# Patient Record
Sex: MALE | Race: BLACK OR AFRICAN AMERICAN
[De-identification: names, ages, dates, MRNs, and addresses within clinical notes are randomized per-mention and may not be internally consistent; named-entity substitution may affect disease eponyms.]

---

## 2020-10-22 ENCOUNTER — HOSPITAL ENCOUNTER (INPATIENT)
Dept: HOSPITAL 92 - SURG A | Age: 50
LOS: 7 days | Discharge: HOME | DRG: 470 | End: 2020-10-29
Attending: INTERNAL MEDICINE | Admitting: ORTHOPAEDIC SURGERY
Payer: COMMERCIAL

## 2020-10-22 ENCOUNTER — HOSPITAL ENCOUNTER (OUTPATIENT)
Dept: HOSPITAL 92 - LABBT | Age: 50
Discharge: HOME | End: 2020-10-22
Attending: ORTHOPAEDIC SURGERY
Payer: COMMERCIAL

## 2020-10-22 VITALS — BODY MASS INDEX: 33.9 KG/M2

## 2020-10-22 DIAGNOSIS — M17.11: Primary | ICD-10-CM

## 2020-10-22 DIAGNOSIS — M17.11: ICD-10-CM

## 2020-10-22 DIAGNOSIS — Z20.828: ICD-10-CM

## 2020-10-22 DIAGNOSIS — Z01.818: Primary | ICD-10-CM

## 2020-10-22 DIAGNOSIS — R03.0: ICD-10-CM

## 2020-10-22 LAB
ANION GAP SERPL CALC-SCNC: 15 MMOL/L (ref 10–20)
BASOPHILS # BLD AUTO: 0.1 THOU/UL (ref 0–0.2)
BASOPHILS NFR BLD AUTO: 1.2 % (ref 0–1)
BUN SERPL-MCNC: 10 MG/DL (ref 8.9–20.6)
CALCIUM SERPL-MCNC: 9.4 MG/DL (ref 7.8–10.44)
CHLORIDE SERPL-SCNC: 107 MMOL/L (ref 98–107)
CO2 SERPL-SCNC: 20 MMOL/L (ref 22–29)
CREAT CL PREDICTED SERPL C-G-VRATE: 0 ML/MIN (ref 70–130)
EOSINOPHIL # BLD AUTO: 0.1 THOU/UL (ref 0–0.7)
EOSINOPHIL NFR BLD AUTO: 2.5 % (ref 0–10)
GLUCOSE SERPL-MCNC: 117 MG/DL (ref 70–105)
HGB BLD-MCNC: 15.5 G/DL (ref 14–18)
INR PPP: 0.9
LYMPHOCYTES # BLD: 1.8 THOU/UL (ref 1.2–3.4)
LYMPHOCYTES NFR BLD AUTO: 31.9 % (ref 21–51)
MCH RBC QN AUTO: 28.1 PG (ref 27–31)
MCV RBC AUTO: 85.4 FL (ref 78–98)
MONOCYTES # BLD AUTO: 0.6 THOU/UL (ref 0.11–0.59)
MONOCYTES NFR BLD AUTO: 9.8 % (ref 0–10)
NEUTROPHILS # BLD AUTO: 3.1 THOU/UL (ref 1.4–6.5)
NEUTROPHILS NFR BLD AUTO: 54.6 % (ref 42–75)
PLATELET # BLD AUTO: 279 THOU/UL (ref 130–400)
POTASSIUM SERPL-SCNC: 4.3 MMOL/L (ref 3.5–5.1)
PROTHROMBIN TIME: 11.9 SEC (ref 12–14.7)
RBC # BLD AUTO: 5.53 MILL/UL (ref 4.7–6.1)
SODIUM SERPL-SCNC: 138 MMOL/L (ref 136–145)
WBC # BLD AUTO: 5.7 THOU/UL (ref 4.8–10.8)

## 2020-10-22 PROCEDURE — 80048 BASIC METABOLIC PNL TOTAL CA: CPT

## 2020-10-22 PROCEDURE — C1776 JOINT DEVICE (IMPLANTABLE): HCPCS

## 2020-10-22 PROCEDURE — 87081 CULTURE SCREEN ONLY: CPT

## 2020-10-22 PROCEDURE — 36415 COLL VENOUS BLD VENIPUNCTURE: CPT

## 2020-10-22 PROCEDURE — 85027 COMPLETE CBC AUTOMATED: CPT

## 2020-10-22 PROCEDURE — C1713 ANCHOR/SCREW BN/BN,TIS/BN: HCPCS

## 2020-10-22 PROCEDURE — U0003 INFECTIOUS AGENT DETECTION BY NUCLEIC ACID (DNA OR RNA); SEVERE ACUTE RESPIRATORY SYNDROME CORONAVIRUS 2 (SARS-COV-2) (CORONAVIRUS DISEASE [COVID-19]), AMPLIFIED PROBE TECHNIQUE, MAKING USE OF HIGH THROUGHPUT TECHNOLOGIES AS DESCRIBED BY CMS-2020-01-R: HCPCS

## 2020-10-22 PROCEDURE — 87635 SARS-COV-2 COVID-19 AMP PRB: CPT

## 2020-10-22 PROCEDURE — 85610 PROTHROMBIN TIME: CPT

## 2020-10-22 PROCEDURE — 93005 ELECTROCARDIOGRAM TRACING: CPT

## 2020-10-22 PROCEDURE — 85025 COMPLETE CBC W/AUTO DIFF WBC: CPT

## 2020-10-22 PROCEDURE — 93010 ELECTROCARDIOGRAM REPORT: CPT

## 2020-10-25 NOTE — EKG
Test Reason : 

Blood Pressure : ***/*** mmHG

Vent. Rate : 082 BPM     Atrial Rate : 082 BPM

   P-R Int : 158 ms          QRS Dur : 096 ms

    QT Int : 358 ms       P-R-T Axes : 035 059 026 degrees

   QTc Int : 418 ms

 

Normal sinus rhythm

Normal ECG

No previous ECGs available

Confirmed by JASEN MCALLISTER (2) on 10/25/2020 12:01:47 PM

 

Referred By:  JENNIFER           Confirmed By:JASEN MCALLISTER

## 2020-10-27 PROCEDURE — 0SRC0J9 REPLACEMENT OF RIGHT KNEE JOINT WITH SYNTHETIC SUBSTITUTE, CEMENTED, OPEN APPROACH: ICD-10-PCS | Performed by: ORTHOPAEDIC SURGERY

## 2020-10-27 RX ADMIN — CEFAZOLIN SODIUM SCH MLS: 2 SOLUTION INTRAVENOUS at 20:39

## 2020-10-27 RX ADMIN — HYDROCODONE BITARTRATE AND ACETAMINOPHEN PRN TAB: 10; 325 TABLET ORAL at 19:08

## 2020-10-27 RX ADMIN — HYDROCODONE BITARTRATE AND ACETAMINOPHEN PRN TAB: 10; 325 TABLET ORAL at 23:24

## 2020-10-27 RX ADMIN — ASPIRIN SCH: 81 TABLET ORAL at 16:45

## 2020-10-27 RX ADMIN — ASPIRIN SCH MG: 81 TABLET ORAL at 20:40

## 2020-10-27 NOTE — PDOC.HHP
Hospitalist HPI





- History of Present Illness


Right knee pain


History of Present Illness: 





PCP: None





Patient is a 50-year-old male with a past medical history significant for 

degenerative joint disease that presents to the hospital as a direct admit for a

scheduled right total knee replacement with Dr. Trivedi.  The patient has been 

complaining of right knee swelling and pain for some time now.  Today, the 

patient underwent a right total knee replacement with Dr. Trivedi.  Postop, the 

patient appeared to be hypertensive with a recorded blood pressure of 175/95 on 

arrival to the floor, with a stable pulse, respirations, SPO2, afebrile.  We 

were consulted for blood pressure management.  Patient denies any chest pain, 

shortness of breath or headache.  He denies any lightheadedness.  He reports 

that he is stooling and voiding per usual.  He has no other complaints at this 

time.


ED Course: 





Direct admit.





Hospitalist ROS





- Review of Systems


Constitutional: denies: fever, chills


Eyes: denies: pain, vision change


ENT: denies: ear pain, nose pain


Respiratory: denies: cough, dry, shortness of breath, hemoptysis, SOB with 

excertion


Cardiovascular: denies: chest pain, palpitations, edema, light headedness


Gastrointestinal: denies: nausea, vomiting, abdominal pain, diarrhea, melena, he

matochezia


Genitourinary: denies: dysuria, hematuria


Musculoskeletal: denies: neck pain, shoulder pain


Neurological: denies: weakness, incoordination, change in speech


All other systems reviewed; all pertinent +/- noted in HPI/Subj





- Medication


Medications: 


Active Medications











Generic Name Dose Route Start Last Admin





  Trade Name Tom  PRN Reason Stop Dose Admin


 


Aspirin  81 mg  10/27/20 09:00  10/27/20 16:45





  Aspirin 81 Mg Enteric Coated Tablet  PO   Not Given





  BID [facility]  


 


Sodium Chloride  1,000 mls @ 100 mls/hr  10/27/20 07:15  10/27/20 16:48





  Normal Saline 0.9%  IV   Not Given





  .Q10H [facility]  


 


Ketorolac Tromethamine  30 mg  10/27/20 12:00  10/27/20 17:50





  Ketorolac Tromethamine 30 Mg/Ml Vial  IVP  10/29/20 06:01  30 mg





  Q6HR [facility]   Administration








Home medications:


1.  Naprosyn as needed





Allergies: NKDA





Hospitalist History





- Past Medical History


Source: patient, RN notes reviewed


Cardiac: reports: no pertinent history


Pulmonary: reports: no pertinent history


CNS: reports: no pertinent history


Musculoskeletal: reports: Other (Degenerative joint disease)





- Past Surgical History


Past Surgical History: reports: Other (Bilateral left rotator cuff repair, right

wrist repair, knee arthroplasty)





- Family History


Family History: reports: no pertinent history


Other Family History: 





Noncontributory to this case.





- Social History


Smoking Status: Never smoker


Alcohol: reports: None


Drugs: reports: none


Living Situation: Alone


Occupation: He is a 


Activity level: independent ambulation (Status post right knee replacement)





- Exam


General Appearance: NAD, awake alert


Eye: anicteric sclera


ENT: normocephalic atraumatic


Neck: supple, symmetric, no JVD


Heart: RRR, no murmur, no gallops, no rubs, normal peripheral pulses


Respiratory: CTAB, no wheezes, no rales, no ronchi, normal chest expansion


Gastrointestinal: soft, non-tender, normal bowel sounds, no bruit, no guarding, 

no rigidity


Extremities: no cyanosis


Neurological: cranial nerve grossly intact, no focal deficits


Psychiatric: normal affect, A&O x 3





Hospitalist Results





- Labs


Lab results: 


Dated 10/22/2020.


Sodium 138, potassium 4.3, BUN 10, creatinine 1.12, glucose 117


WBC 5.7, Hgb 15.5, HCT 47.2, platelets 279


Covid negative





Hospitalist H&P A/P





- Problem


(1) Elevated blood pressure reading


Code(s): R03.0 - ELEVATED BLOOD-PRESSURE READING, W/O DIAGNOSIS OF HTN   Status:

Acute   





(2) Degenerative joint disease


Code(s): M19.90 - UNSPECIFIED OSTEOARTHRITIS, UNSPECIFIED SITE   Status: Chronic

  





(3) Status post total right knee replacement


Code(s): Z96.651 - PRESENCE OF RIGHT ARTIFICIAL KNEE JOINT   Status: Acute   





- Plan


Plan: 





50/M with PMH significant for DJD presents to hospital for scheduled right total

knee replacement.


Admit surgical floor, inpatient status.  Expected length of stay at least 2 

midnights.





#Elevated blood pressure reading


Documented /95.


Upon assessment, SBP 140s.


Patient asymptomatic.


Patient has no history or diagnoses of HTN.


Continue to monitor BP.


Control pain with analgesia.


We will add antihypertensives as needed if necessary.





#DJD


Chronic.





#Status post total right knee replacement


POD #0 by Dr. Trivedi.


Clinical course per orthopedics.





Aspirin for DVT prophylaxis per orthopedics.


Nerve block per anesthesia.


Consult PT per orthopedics.


Discussed the case with Dr. Ellsworth.

## 2020-10-27 NOTE — RAD
RIGHT KNEE TWO VIEWS:

10/27/20

 

HISTORY: 

Total knee arthroplasty postop. 

 

FINDINGS/IMPRESSION: 

Recent postop changes of total knee arthroplasty in good position and alignment. Soft tissue air is p
resent. 

 

POS: AH

## 2020-10-28 LAB
ANION GAP SERPL CALC-SCNC: 15 MMOL/L (ref 10–20)
BUN SERPL-MCNC: 13 MG/DL (ref 8.9–20.6)
CALCIUM SERPL-MCNC: 9.2 MG/DL (ref 7.8–10.44)
CHLORIDE SERPL-SCNC: 106 MMOL/L (ref 98–107)
CO2 SERPL-SCNC: 21 MMOL/L (ref 22–29)
CREAT CL PREDICTED SERPL C-G-VRATE: 120 ML/MIN (ref 70–130)
GLUCOSE SERPL-MCNC: 121 MG/DL (ref 70–105)
HGB BLD-MCNC: 13.2 G/DL (ref 14–18)
MCH RBC QN AUTO: 28.7 PG (ref 27–31)
MCV RBC AUTO: 84.1 FL (ref 78–98)
PLATELET # BLD AUTO: 267 THOU/UL (ref 130–400)
POTASSIUM SERPL-SCNC: 4.1 MMOL/L (ref 3.5–5.1)
RBC # BLD AUTO: 4.59 MILL/UL (ref 4.7–6.1)
SODIUM SERPL-SCNC: 139 MMOL/L (ref 136–145)
WBC # BLD AUTO: 12.5 THOU/UL (ref 4.8–10.8)

## 2020-10-28 RX ADMIN — HYDROCODONE BITARTRATE AND ACETAMINOPHEN PRN TAB: 10; 325 TABLET ORAL at 05:06

## 2020-10-28 RX ADMIN — CEFAZOLIN SODIUM SCH MLS: 2 SOLUTION INTRAVENOUS at 05:06

## 2020-10-28 RX ADMIN — MULTIPLE VITAMINS W/ MINERALS TAB SCH TAB: TAB at 09:26

## 2020-10-28 RX ADMIN — DOCUSATE SODIUM 50 MG AND SENNOSIDES 8.6 MG SCH TAB: 8.6; 5 TABLET, FILM COATED ORAL at 09:24

## 2020-10-28 RX ADMIN — HYDROCODONE BITARTRATE AND ACETAMINOPHEN PRN TAB: 10; 325 TABLET ORAL at 23:41

## 2020-10-28 RX ADMIN — ASPIRIN SCH MG: 81 TABLET ORAL at 09:26

## 2020-10-28 RX ADMIN — DOCUSATE SODIUM 50 MG AND SENNOSIDES 8.6 MG SCH TAB: 8.6; 5 TABLET, FILM COATED ORAL at 20:48

## 2020-10-28 RX ADMIN — HYDROCODONE BITARTRATE AND ACETAMINOPHEN PRN TAB: 10; 325 TABLET ORAL at 18:52

## 2020-10-28 RX ADMIN — HYDROCODONE BITARTRATE AND ACETAMINOPHEN PRN TAB: 10; 325 TABLET ORAL at 09:24

## 2020-10-28 RX ADMIN — ASPIRIN SCH MG: 81 TABLET ORAL at 20:48

## 2020-10-28 NOTE — PDOC.HOSPP
- Subjective


Encounter Date: 10/28/20


Subjective: 


The patient is feeling better today.


His pain is controlled.


Denies any new complaints.





- Objective


Vital Signs & Weight: 


                             Vital Signs (12 hours)











  Temp Pulse Resp BP Pulse Ox


 


 10/28/20 11:14  98.4 F  70  20  132/84  96


 


 10/28/20 07:19  98.5 F  77  20  125/84  95


 


 10/28/20 05:03  98.2 F  88  16  145/77 H  98








                                     Weight











Weight                         250 lb














I&O: 


                                        











 10/27/20 10/28/20 10/29/20





 06:59 06:59 06:59


 


Intake Total  1285 


 


Output Total  1400 


 


Balance  -115 











Result Diagrams: 


                                 10/28/20 05:25





                                 10/28/20 05:25





Hospitalist ROS





- Medication


Medications: 


Active Medications











Generic Name Dose Route Start Last Admin





  Trade Name Freq  PRN Reason Stop Dose Admin


 


Hydrocodone Bitart/Acetaminophen  2 tab  10/27/20 10:15  10/28/20 09:24





  Hydrocodone/Acetaminophen 10/325 Mg Tablet  PO   2 tab





  Q4H [facility]   Administration





  PAIN (4-6)  


 


Aspirin  81 mg  10/27/20 09:00  10/28/20 09:26





  Aspirin 81 Mg Enteric Coated Tablet  PO   81 mg





  BID [facility]   Administration


 


Ferrous Gluconate  324 mg  10/28/20 09:00  10/28/20 09:26





  Ferrous Gluconate 324 Mg Tab  PO   324 mg





  BID [facility]   Administration


 


Sodium Chloride  1,000 mls @ 100 mls/hr  10/27/20 07:15  10/28/20 13:08





  Normal Saline 0.9%  IV   Not Given





  .Q10H [facility]  


 


Iron/Minerals/Multivitamins  1 tab  10/28/20 09:00  10/28/20 09:26





  Multivitamin W/ Minerals 1 Tab  PO   1 tab





  DAILY [facility]   Administration


 


Ketorolac Tromethamine  30 mg  10/27/20 12:00  10/28/20 12:46





  Ketorolac Tromethamine 30 Mg/Ml Vial  IVP  10/29/20 06:01  30 mg





  Q6HR [facility]   Administration


 


Melatonin  6 mg  10/27/20 21:00  10/27/20 20:40





  Melatonin 3 Mg Tab  PO   6 mg





  [facility]   Administration


 


Senna/Docusate Sodium  2 tab  10/28/20 09:00  10/28/20 09:24





  Senokot S 8.6-50 Mg Tab  PO   2 tab





  BID [facility]   Administration














- Exam


General Appearance: awake alert


ENT: normocephalic atraumatic


Neck: supple, no JVD


Heart: RRR


Respiratory: normal chest expansion, no tachypnea


Neurological: cranial nerve grossly intact, no weakness





Hosp A/P


(1) Elevated blood pressure reading


Code(s): R03.0 - ELEVATED BLOOD-PRESSURE READING, W/O DIAGNOSIS OF HTN   Status:

Acute   





(2) Status post total right knee replacement


Code(s): Z96.651 - PRESENCE OF RIGHT ARTIFICIAL KNEE JOINT   Status: Acute   





(3) Degenerative joint disease


Code(s): M19.90 - UNSPECIFIED OSTEOARTHRITIS, UNSPECIFIED SITE   Status: Chronic

  





- Plan





The patient was elevated blood pressure was likely due to uncontrolled pain.


On his blood pressure measurements have improved since that his pain control got

better.


We will continue to monitor his vital signs during his hospital stay.

## 2020-10-28 NOTE — OP
DATE OF PROCEDURE:  10/27/2020



PREOPERATIVE DIAGNOSIS:  Degenerative joint disease, right knee.



POSTOPERATIVE DIAGNOSIS:  Degenerative joint disease, right knee.



PROCEDURES:  Right total knee arthroplasty using Squires Triathlon 6 femur, 5 tibia,

A35 patella, 9 mm CS X3 polyethylene. 



ASSISTANT:  Kj.  The assistant/co-surgeon was present through the entire

procedure and was responsible for providing exposure, tissue retraction and any

necessary limb or tissue manipulation required to obtain necessary reduction or

hardware placement.  The assistant/co-surgeon also provided bleeding control, tissue

closure, and suturing in conjunction with the primary surgeon 



BLOOD LOSS:  Minimal.



SPECIMEN:  None.



DRAINS:  None.



COMPLICATIONS:  None.



TOURNIQUET TIME:  72 minutes.



PROCEDURE IN DETAIL:  After informed consent was obtained in the preoperative

holding area, the patient was taken to the operative suite where general anesthesia

was induced.  Once adequate level of general anesthesia was obtained, the patient

was positioned and a well-padded tourniquet was placed around the right proximal

thigh.  The right lower extremity was then prepped and draped in the usual sterile

fashion.  Prior to exsanguination, a time-out was called and all members of the

surgical team agreed upon site, surgeon, and patient.  The extremity was then

exsanguinated and the tourniquet was raised.  A midline longitudinal incision was

then made directly over the patella extending 2 fingerbreadths above the superior

pole of the patella and 2 fingerbreadths inferior to the inferior patellar pole of

the patella.  Deeper subcutaneous layers were dissected sharply and local bleeding

was controlled with Bovie electrocautery.  A quad tendon longitudinal split was then

made sharply and a median parapatellar arthrotomy was carried out both sharp and

with Bovie electrocautery, carried down to 1 fingerbreadth medial to the tibial

tubercle.  The knee was then placed into flexion and the patella was everted nicely,

and a copious fat pad ectomy was performed allowing for greater exposure of the

tibia.  The computer-assisted distal femoral fiducial was then placed and pinned

firmly, and the distal femoral cutting guide was pinned firmly into place.  The

oscillating saw was then used to remove the appropriate amount of bone.  The 4-in-1

cutting block was then placed on the distal femur and the oscillating saw was used

to remove the appropriate amount of bone off the anterior, posterior, and chamfer

cuts.  After completion of bone cuts, the anterior cruciate ligament was resected

sharply and the posterior cruciate ligament retractor was placed and the tibia was

subluxed for better exposure.  Partial meniscectomies were carried out, and the

tibial computer-assisted fiducial was pinned, and the cutting guide was placed.

Oscillating saw was then used to remove the bone, with Hohmann retractors used to

take care and protect the collateral ligaments.  After the tibial resection was

performed, a laminar  was placed in between the freshened bone cuts.  The

knee placed at 90 degrees and further bilateral meniscectomies were carried out, and

the curved osteotome and curettage were used to remove any excess bone spurs in the

posterior compartment.  The trial femoral component, tibial baseplate were placed

with the appropriate polyethylene trial insert with an appropriate polyethylene

spacer and patellar button.  The knee was taken through full range of motion with

flexion and extension from 0 to 90 degrees and patellar broach squarely in the

trochlea without any squinting or subluxation noted.  The knee was also stable to

varus and valgus stressing at 0, 15, 45, and 90 degrees of flexion.  The drawer was

negative.  All trial components were then removed and the keel punch was used to

provide the appropriate defect in the tibia with a mallet.  The freshened bone cuts

were copiously irrigated with pulsatile lavage of about 1.5 L to remove all excess

debris.  The freshened bone cuts were then dried with suction and lap sponge.  The

knee was placed in flexion and retractors were placed to provide access to all bone

cuts.  Tobramycin-impregnated methyl methacrylate cement was then placed on the

freshened bone cuts and implants which were malleted firmly into place.  Curettage

and Douglas elevators were used to remove any excess bone cement.  The knee was placed

into full extension and the patellar button was placed under compression, and the

cement was allowed to cure.  Once completed, the components were again taken through

full range of motion and copious irrigation of the knee was carried out with another

liter of normal saline.  All components were inspected fully with full range of

motion and varus and valgus stressing.  There was no laxity noted and full extension

was observed clinically.  Primary closure was accomplished with #2 interrupted

Vicryl stitch of the arthrotomy defect.  This was oversewn with a #2 running Quill

barbed stitch.  The gravitational platelet system was then injected into the

arthrotomy prior to closure.  The subcutaneous layer was then closed with a running

0 barbed Monocryl stitch and skin closure accomplished with a running subcuticular

3-0 Monocryl barbed Quill stitch and augmented with cement on the skin.  Tourniquet

was lowered.  Good spontaneous return of distal pulses was noted clinically and a

sterile dressing was applied to the incision.  The procedure was terminated without

any complications.  The patient was awakened in the operative suite and the patient

was taken to the recovery room in stable condition. 







Job ID:  [id number]

## 2020-10-29 VITALS — DIASTOLIC BLOOD PRESSURE: 88 MMHG | SYSTOLIC BLOOD PRESSURE: 160 MMHG | TEMPERATURE: 98.2 F

## 2020-10-29 RX ADMIN — MULTIPLE VITAMINS W/ MINERALS TAB SCH TAB: TAB at 09:31

## 2020-10-29 RX ADMIN — ASPIRIN SCH MG: 81 TABLET ORAL at 09:29

## 2020-10-29 RX ADMIN — DOCUSATE SODIUM 50 MG AND SENNOSIDES 8.6 MG SCH TAB: 8.6; 5 TABLET, FILM COATED ORAL at 09:29

## 2020-10-29 RX ADMIN — HYDROCODONE BITARTRATE AND ACETAMINOPHEN PRN TAB: 10; 325 TABLET ORAL at 09:31

## 2020-10-29 NOTE — PDOC.HOSPP
- Subjective


Encounter Date: 10/29/20


Subjective: 


The patient was seen and examined.


He denies any new complaints today.





- Objective


Vital Signs & Weight: 


                             Vital Signs (12 hours)











  Temp Pulse Resp BP Pulse Ox


 


 10/29/20 11:00  98.2 F  89  16  160/88 H  99


 


 10/29/20 09:29      98


 


 10/29/20 08:25  98.6 F  81  16  134/86  98


 


 10/29/20 03:38  98.2 F  81  18  123/72  99








                                     Weight











Admit Weight                   250 lb


 


Weight                         250 lb














I&O: 


                                        











 10/28/20 10/29/20 10/30/20





 06:59 06:59 06:59


 


Intake Total 1285 2500 


 


Output Total 1400 500 


 


Balance -115 2000 











Result Diagrams: 


                                 10/28/20 05:25





                                 10/28/20 05:25





Hospitalist ROS





- Medication


Medications: 


Active Medications











Generic Name Dose Route Start Last Admin





  Trade Name Freq  PRN Reason Stop Dose Admin


 


Hydrocodone Bitart/Acetaminophen  1 tab  10/27/20 10:15  10/29/20 09:31





  Hydrocodone/Acetaminophen 10/325 Mg Tablet  PO   1 tab





  Q4H [facility]   Administration





  Pain (1-3)  


 


Hydrocodone Bitart/Acetaminophen  2 tab  10/27/20 10:15  10/28/20 18:52





  Hydrocodone/Acetaminophen 10/325 Mg Tablet  PO   2 tab





  Q4H [facility]   Administration





  PAIN (4-6)  


 


Aspirin  81 mg  10/27/20 09:00  10/29/20 09:29





  Aspirin 81 Mg Enteric Coated Tablet  PO   81 mg





  BID [facility]   Administration


 


Ferrous Gluconate  324 mg  10/28/20 09:00  10/29/20 09:32





  Ferrous Gluconate 324 Mg Tab  PO   324 mg





  BID [facility]   Administration


 


Sodium Chloride  1,000 mls @ 100 mls/hr  10/27/20 07:15  10/29/20 09:35





  Normal Saline 0.9%  IV   Not Given





  .Q10H [facility]  


 


Iron/Minerals/Multivitamins  1 tab  10/28/20 09:00  10/29/20 09:31





  Multivitamin W/ Minerals 1 Tab  PO   1 tab





  DAILY [facility]   Administration


 


Melatonin  6 mg  10/27/20 21:00  10/28/20 20:48





  Melatonin 3 Mg Tab  PO   6 mg





  [facility]   Administration


 


Senna/Docusate Sodium  2 tab  10/28/20 09:00  10/29/20 09:29





  Senokot S 8.6-50 Mg Tab  PO   2 tab





  BID [facility]   Administration














- Exam


General Appearance: awake alert


ENT: normocephalic atraumatic


Neck: supple, no JVD


Respiratory: normal chest expansion, no tachypnea


Neurological: cranial nerve grossly intact, no focal deficits





Hosp A/P


(1) Elevated blood pressure reading


Code(s): R03.0 - ELEVATED BLOOD-PRESSURE READING, W/O DIAGNOSIS OF HTN   Status:

Acute   





(2) Status post total right knee replacement


Code(s): Z96.651 - PRESENCE OF RIGHT ARTIFICIAL KNEE JOINT   Status: Acute   





(3) Degenerative joint disease


Code(s): M19.90 - UNSPECIFIED OSTEOARTHRITIS, UNSPECIFIED SITE   Status: Chronic

  





- Plan





The patient's blood pressure appears to be stable at this point.


He does not require any oral antihypertensive medications.


Continue management per surgical team.


We will continue to monitor his vital signs during his hospital stay.